# Patient Record
Sex: MALE | URBAN - METROPOLITAN AREA
[De-identification: names, ages, dates, MRNs, and addresses within clinical notes are randomized per-mention and may not be internally consistent; named-entity substitution may affect disease eponyms.]

---

## 2023-01-12 ENCOUNTER — OFFICE VISIT (OUTPATIENT)
Dept: URBAN - METROPOLITAN AREA CLINIC 92 | Facility: CLINIC | Age: 73
End: 2023-01-12
Payer: MEDICARE

## 2023-01-12 ENCOUNTER — DASHBOARD ENCOUNTERS (OUTPATIENT)
Age: 73
End: 2023-01-12

## 2023-01-12 VITALS
TEMPERATURE: 98.1 F | SYSTOLIC BLOOD PRESSURE: 168 MMHG | HEART RATE: 97 BPM | DIASTOLIC BLOOD PRESSURE: 92 MMHG | WEIGHT: 197 LBS | HEIGHT: 72 IN | BODY MASS INDEX: 26.68 KG/M2

## 2023-01-12 DIAGNOSIS — K59.01 CONSTIPATION: ICD-10-CM

## 2023-01-12 DIAGNOSIS — Z12.11 COLON CANCER SCREENING: ICD-10-CM

## 2023-01-12 PROBLEM — 14760008 CONSTIPATION: Status: ACTIVE | Noted: 2023-01-12

## 2023-01-12 PROCEDURE — 99203 OFFICE O/P NEW LOW 30 MIN: CPT | Performed by: INTERNAL MEDICINE

## 2023-01-12 RX ORDER — RIVAROXABAN 10 MG/1
1 TABLET TABLET, FILM COATED ORAL ONCE A DAY
Status: ACTIVE | COMMUNITY

## 2023-01-12 RX ORDER — METOPROLOL SUCCINATE 25 MG/1
TABLET, EXTENDED RELEASE ORAL
Qty: 90 TABLET | Status: ACTIVE | COMMUNITY

## 2023-01-12 RX ORDER — GLIMEPIRIDE 4 MG/1
TABLET ORAL
Qty: 90 EACH | Status: ACTIVE | COMMUNITY

## 2023-01-12 RX ORDER — OMEPRAZOLE 40 MG/1
CAPSULE, DELAYED RELEASE ORAL
Qty: 90 EACH | Status: ACTIVE | COMMUNITY

## 2023-01-12 RX ORDER — GABAPENTIN 300 MG/1
CAPSULE ORAL
Qty: 90 CAPSULE | Status: ACTIVE | COMMUNITY

## 2023-01-12 RX ORDER — SOTALOL HYDROCHLORIDE 80 MG/1
TAKE 1 TABLET BY MOUTH TWICE A DAY TABLET ORAL
Qty: 60 EACH | Refills: 0 | Status: ACTIVE | COMMUNITY

## 2023-01-12 RX ORDER — AMLODIPINE AND VALSARTAN 10; 160 MG/1; MG/1
TABLET, FILM COATED ORAL
Qty: 90 TABLET | Status: ACTIVE | COMMUNITY

## 2023-01-12 RX ORDER — METFORMIN HYDROCHLORIDE 500 MG/1
TABLET ORAL
Qty: 60 EACH | Status: ACTIVE | COMMUNITY

## 2023-01-12 RX ORDER — TADALAFIL 20 MG/1
TAKE 1 TABLET AS NEEDED ORALLY EVERY 36 HOURS FOR 30 DAY(S) TABLET, COATED ORAL
Qty: 6 EACH | Refills: 0 | Status: ACTIVE | COMMUNITY

## 2023-01-12 RX ORDER — LATANOPROST 50 UG/ML
SOLUTION OPHTHALMIC
Qty: 10 MILLILITER | Status: ACTIVE | COMMUNITY

## 2023-01-12 RX ORDER — METFORMIN ER 500 MG 500 MG/1
TABLET ORAL
Qty: 180 TABLET | Status: ACTIVE | COMMUNITY

## 2023-01-12 RX ORDER — KETOCONAZOLE 20 MG/G
CREAM TOPICAL
Qty: 60 GRAM | Status: ACTIVE | COMMUNITY

## 2023-01-12 NOTE — PHYSICAL EXAM RECTAL:
normal tone, no external hemorrhoids, no masses palpable, no red blood, Tenderness on SOURAV, Internal hemorrhoids present

## 2023-01-12 NOTE — HPI-TODAY'S VISIT:
This is a 71 yo M referred by Dr. Oneal Lawson for a colonoscopy.  A copy of this document will be sent to the referring provider. The patient denies abdominal pain, weight loss, rectal bleeding, diarrhea, and a change in bowel habits.  There is no family history of colon cancer or colon polyps.  The patient admits to occasional constipation relieved with a high fiber diet. The patient has a history of CAD s/p stent placement.  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  Cause of PE remains unknown.  He denies SOB and chest pain.   He is scheduled ot f/u with his [facility] cardiologist this month.

## 2023-04-28 ENCOUNTER — OFFICE VISIT (OUTPATIENT)
Dept: URBAN - METROPOLITAN AREA SURGERY CENTER 16 | Facility: SURGERY CENTER | Age: 73
End: 2023-04-28

## 2023-06-05 ENCOUNTER — OFFICE VISIT (OUTPATIENT)
Dept: URBAN - METROPOLITAN AREA SURGERY CENTER 16 | Facility: SURGERY CENTER | Age: 73
End: 2023-06-05
Payer: MEDICARE

## 2023-06-05 DIAGNOSIS — Z12.11 COLON CANCER SCREENING: ICD-10-CM

## 2023-06-05 PROCEDURE — G8907 PT DOC NO EVENTS ON DISCHARG: HCPCS | Performed by: INTERNAL MEDICINE

## 2023-06-05 PROCEDURE — G0121 COLON CA SCRN NOT HI RSK IND: HCPCS | Performed by: INTERNAL MEDICINE

## 2023-06-05 RX ORDER — TADALAFIL 20 MG/1
TAKE 1 TABLET AS NEEDED ORALLY EVERY 36 HOURS FOR 30 DAY(S) TABLET, COATED ORAL
Qty: 6 EACH | Refills: 0 | Status: ACTIVE | COMMUNITY

## 2023-06-05 RX ORDER — RIVAROXABAN 10 MG/1
1 TABLET TABLET, FILM COATED ORAL ONCE A DAY
Status: ACTIVE | COMMUNITY

## 2023-06-05 RX ORDER — OMEPRAZOLE 40 MG/1
CAPSULE, DELAYED RELEASE ORAL
Qty: 90 EACH | Status: ACTIVE | COMMUNITY

## 2023-06-05 RX ORDER — GABAPENTIN 300 MG/1
CAPSULE ORAL
Qty: 90 CAPSULE | Status: ACTIVE | COMMUNITY

## 2023-06-05 RX ORDER — SOTALOL HYDROCHLORIDE 80 MG/1
TAKE 1 TABLET BY MOUTH TWICE A DAY TABLET ORAL
Qty: 60 EACH | Refills: 0 | Status: ACTIVE | COMMUNITY

## 2023-06-05 RX ORDER — METFORMIN ER 500 MG 500 MG/1
TABLET ORAL
Qty: 180 TABLET | Status: ACTIVE | COMMUNITY

## 2023-06-05 RX ORDER — LATANOPROST 50 UG/ML
SOLUTION OPHTHALMIC
Qty: 10 MILLILITER | Status: ACTIVE | COMMUNITY

## 2023-06-05 RX ORDER — AMLODIPINE AND VALSARTAN 10; 160 MG/1; MG/1
TABLET, FILM COATED ORAL
Qty: 90 TABLET | Status: ACTIVE | COMMUNITY

## 2023-06-05 RX ORDER — GLIMEPIRIDE 4 MG/1
TABLET ORAL
Qty: 90 EACH | Status: ACTIVE | COMMUNITY

## 2023-06-05 RX ORDER — KETOCONAZOLE 20 MG/G
CREAM TOPICAL
Qty: 60 GRAM | Status: ACTIVE | COMMUNITY

## 2023-06-05 RX ORDER — METFORMIN HYDROCHLORIDE 500 MG/1
TABLET ORAL
Qty: 60 EACH | Status: ACTIVE | COMMUNITY

## 2023-06-05 RX ORDER — METOPROLOL SUCCINATE 25 MG/1
TABLET, EXTENDED RELEASE ORAL
Qty: 90 TABLET | Status: ACTIVE | COMMUNITY

## 2024-06-13 ENCOUNTER — OFFICE VISIT (OUTPATIENT)
Dept: URBAN - METROPOLITAN AREA CLINIC 92 | Facility: CLINIC | Age: 74
End: 2024-06-13

## 2024-06-26 ENCOUNTER — OFFICE VISIT (OUTPATIENT)
Dept: URBAN - METROPOLITAN AREA CLINIC 92 | Facility: CLINIC | Age: 74
End: 2024-06-26
Payer: MEDICARE

## 2024-06-26 VITALS
HEART RATE: 80 BPM | SYSTOLIC BLOOD PRESSURE: 130 MMHG | HEIGHT: 72 IN | BODY MASS INDEX: 26.68 KG/M2 | DIASTOLIC BLOOD PRESSURE: 69 MMHG | TEMPERATURE: 96.8 F | WEIGHT: 197 LBS

## 2024-06-26 DIAGNOSIS — D50.9 MICROCYTIC ANEMIA: ICD-10-CM

## 2024-06-26 DIAGNOSIS — Z79.01 CHRONIC ANTICOAGULATION: ICD-10-CM

## 2024-06-26 PROBLEM — 711150003: Status: ACTIVE | Noted: 2024-06-26

## 2024-06-26 PROBLEM — 234349007: Status: ACTIVE | Noted: 2024-06-26

## 2024-06-26 PROCEDURE — 99203 OFFICE O/P NEW LOW 30 MIN: CPT

## 2024-06-26 RX ORDER — TADALAFIL 20 MG/1
TAKE 1 TABLET AS NEEDED ORALLY EVERY 36 HOURS FOR 30 DAY(S) TABLET, COATED ORAL
Qty: 6 EACH | Refills: 0 | Status: ON HOLD | COMMUNITY

## 2024-06-26 RX ORDER — GABAPENTIN 300 MG/1
CAPSULE ORAL
Qty: 90 CAPSULE | Status: ON HOLD | COMMUNITY

## 2024-06-26 RX ORDER — KETOCONAZOLE 20 MG/G
CREAM TOPICAL
Qty: 60 GRAM | Status: ACTIVE | COMMUNITY

## 2024-06-26 RX ORDER — METOPROLOL SUCCINATE 25 MG/1
TABLET, EXTENDED RELEASE ORAL
Qty: 90 TABLET | Status: ACTIVE | COMMUNITY

## 2024-06-26 RX ORDER — LATANOPROST 50 UG/ML
SOLUTION OPHTHALMIC
Qty: 10 MILLILITER | Status: ACTIVE | COMMUNITY

## 2024-06-26 RX ORDER — SOTALOL HYDROCHLORIDE 80 MG/1
TAKE 1 TABLET BY MOUTH TWICE A DAY TABLET ORAL
Qty: 60 EACH | Refills: 0 | Status: ACTIVE | COMMUNITY

## 2024-06-26 RX ORDER — OMEPRAZOLE 40 MG/1
CAPSULE, DELAYED RELEASE ORAL
Qty: 90 EACH | Status: ACTIVE | COMMUNITY

## 2024-06-26 RX ORDER — METFORMIN ER 500 MG 500 MG/1
TABLET ORAL
Qty: 180 TABLET | Status: ACTIVE | COMMUNITY

## 2024-06-26 RX ORDER — RIVAROXABAN 10 MG/1
1 TABLET TABLET, FILM COATED ORAL ONCE A DAY
Status: ACTIVE | COMMUNITY

## 2024-06-26 RX ORDER — GLIMEPIRIDE 4 MG/1
TABLET ORAL
Qty: 90 EACH | Status: ACTIVE | COMMUNITY

## 2024-06-26 RX ORDER — METFORMIN HYDROCHLORIDE 500 MG/1
TABLET ORAL
Qty: 60 EACH | Status: ACTIVE | COMMUNITY

## 2024-06-26 RX ORDER — AMLODIPINE AND VALSARTAN 10; 160 MG/1; MG/1
TABLET, FILM COATED ORAL
Qty: 90 TABLET | Status: ACTIVE | COMMUNITY

## 2024-06-26 NOTE — HPI-TODAY'S VISIT:
Patient is a 73 year old male who presents for anemia. Previously seen in 2023 by Dr. Rendon for a screening colonoscopy. Colonoscopy on 6/05/23 with Dr. Rendon revealed a normal colon, no specimens collected, repeat colon not recommended. There is no family history of colon cancer or colon polyps.   The patient has a history of CAD s/p stent placement.  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  Cause of PE remains unknown.  He denies SOB or chest pain but notes of occasional dizziness. He sees HemOnc PA Ashlee Bishop at [facility] for anemia who referred him back to GI.   Today, patient notes of occasional reflux. Denies dysphagia, odynophagia, nausea, or vomiting. Denies hematochezia, diarrhea, constipation, or melena. Patient has a daily BM. Denies NSAID use. Patient denies any cardiac, lung, or kidney issues. No pacemaker/defibrillator, No home O2. No dialysis.

## 2024-07-09 ENCOUNTER — TELEPHONE ENCOUNTER (OUTPATIENT)
Dept: URBAN - METROPOLITAN AREA CLINIC 92 | Facility: CLINIC | Age: 74
End: 2024-07-09

## 2024-09-23 ENCOUNTER — CLAIMS CREATED FROM THE CLAIM WINDOW (OUTPATIENT)
Dept: URBAN - METROPOLITAN AREA SURGERY CENTER 16 | Facility: SURGERY CENTER | Age: 74
End: 2024-09-23
Payer: MEDICARE

## 2024-09-23 ENCOUNTER — CLAIMS CREATED FROM THE CLAIM WINDOW (OUTPATIENT)
Dept: URBAN - METROPOLITAN AREA CLINIC 4 | Facility: CLINIC | Age: 74
End: 2024-09-23
Payer: MEDICARE

## 2024-09-23 ENCOUNTER — OFFICE VISIT (OUTPATIENT)
Dept: URBAN - METROPOLITAN AREA SURGERY CENTER 16 | Facility: SURGERY CENTER | Age: 74
End: 2024-09-23

## 2024-09-23 DIAGNOSIS — K92.2 UPPER GI BLEEDING: ICD-10-CM

## 2024-09-23 DIAGNOSIS — D50.9 ANEMIA, IRON DEFICIENCY: ICD-10-CM

## 2024-09-23 DIAGNOSIS — K21.9 GASTRIC REFLUX: ICD-10-CM

## 2024-09-23 DIAGNOSIS — K31.89 OTHER DISEASES OF STOMACH AND DUODENUM: ICD-10-CM

## 2024-09-23 PROCEDURE — 88305 TISSUE EXAM BY PATHOLOGIST: CPT | Performed by: PATHOLOGY

## 2024-09-23 PROCEDURE — 88342 IMHCHEM/IMCYTCHM 1ST ANTB: CPT | Performed by: PATHOLOGY

## 2024-09-23 PROCEDURE — 00731 ANES UPR GI NDSC PX NOS: CPT | Performed by: ANESTHESIOLOGY

## 2024-09-23 PROCEDURE — 00731 ANES UPR GI NDSC PX NOS: CPT | Performed by: ANESTHESIOLOGIST ASSISTANT

## 2024-10-07 ENCOUNTER — TELEPHONE ENCOUNTER (OUTPATIENT)
Dept: URBAN - METROPOLITAN AREA CLINIC 92 | Facility: CLINIC | Age: 74
End: 2024-10-07

## 2024-10-10 ENCOUNTER — OFFICE VISIT (OUTPATIENT)
Dept: URBAN - METROPOLITAN AREA CLINIC 92 | Facility: CLINIC | Age: 74
End: 2024-10-10

## 2024-10-17 ENCOUNTER — OFFICE VISIT (OUTPATIENT)
Dept: URBAN - METROPOLITAN AREA CLINIC 92 | Facility: CLINIC | Age: 74
End: 2024-10-17

## 2024-10-17 RX ORDER — OMEPRAZOLE 40 MG/1
CAPSULE, DELAYED RELEASE ORAL
Qty: 90 EACH | Status: ACTIVE | COMMUNITY

## 2024-10-17 RX ORDER — TADALAFIL 20 MG/1
TAKE 1 TABLET AS NEEDED ORALLY EVERY 36 HOURS FOR 30 DAY(S) TABLET, COATED ORAL
Qty: 6 EACH | Refills: 0 | COMMUNITY

## 2024-10-17 RX ORDER — RIVAROXABAN 10 MG/1
1 TABLET TABLET, FILM COATED ORAL ONCE A DAY
Status: ACTIVE | COMMUNITY

## 2024-10-17 RX ORDER — METOPROLOL SUCCINATE 25 MG/1
TABLET, EXTENDED RELEASE ORAL
Qty: 90 TABLET | Status: ACTIVE | COMMUNITY

## 2024-10-17 RX ORDER — GLIMEPIRIDE 4 MG/1
TABLET ORAL
Qty: 90 EACH | Status: ACTIVE | COMMUNITY

## 2024-10-17 RX ORDER — METFORMIN ER 500 MG 500 MG/1
TABLET ORAL
Qty: 180 TABLET | Status: ACTIVE | COMMUNITY

## 2024-10-17 RX ORDER — GABAPENTIN 300 MG/1
CAPSULE ORAL
Qty: 90 CAPSULE | COMMUNITY

## 2024-10-17 RX ORDER — SOTALOL HYDROCHLORIDE 80 MG/1
TAKE 1 TABLET BY MOUTH TWICE A DAY TABLET ORAL
Qty: 60 EACH | Refills: 0 | Status: ACTIVE | COMMUNITY

## 2024-10-17 RX ORDER — AMLODIPINE AND VALSARTAN 10; 160 MG/1; MG/1
TABLET, FILM COATED ORAL
Qty: 90 TABLET | Status: ACTIVE | COMMUNITY

## 2024-10-17 RX ORDER — METFORMIN HYDROCHLORIDE 500 MG/1
TABLET ORAL
Qty: 60 EACH | Status: ACTIVE | COMMUNITY

## 2024-10-17 RX ORDER — LATANOPROST 50 UG/ML
SOLUTION OPHTHALMIC
Qty: 10 MILLILITER | Status: ACTIVE | COMMUNITY

## 2024-10-17 RX ORDER — KETOCONAZOLE 20 MG/G
CREAM TOPICAL
Qty: 60 GRAM | Status: ACTIVE | COMMUNITY